# Patient Record
Sex: MALE | Race: WHITE | URBAN - METROPOLITAN AREA
[De-identification: names, ages, dates, MRNs, and addresses within clinical notes are randomized per-mention and may not be internally consistent; named-entity substitution may affect disease eponyms.]

---

## 2023-12-26 ENCOUNTER — HOSPITAL ENCOUNTER (OUTPATIENT)
Facility: HOSPITAL | Age: 43
Setting detail: OBSERVATION
Discharge: HOME | End: 2023-12-27
Attending: EMERGENCY MEDICINE | Admitting: HOSPITALIST
Payer: COMMERCIAL

## 2023-12-26 ENCOUNTER — APPOINTMENT (EMERGENCY)
Dept: CT IMAGING | Facility: HOSPITAL | Age: 43
End: 2023-12-26
Payer: COMMERCIAL

## 2023-12-26 ENCOUNTER — APPOINTMENT (OUTPATIENT)
Dept: RADIOLOGY | Facility: HOSPITAL | Age: 43
End: 2023-12-26
Payer: COMMERCIAL

## 2023-12-26 ENCOUNTER — APPOINTMENT (EMERGENCY)
Dept: RADIOLOGY | Facility: HOSPITAL | Age: 43
End: 2023-12-26
Payer: COMMERCIAL

## 2023-12-26 DIAGNOSIS — T50.901A ACCIDENTAL DRUG OVERDOSE, INITIAL ENCOUNTER: Primary | ICD-10-CM

## 2023-12-26 DIAGNOSIS — R79.89 ELEVATED TROPONIN: ICD-10-CM

## 2023-12-26 DIAGNOSIS — G93.40 ENCEPHALOPATHY: ICD-10-CM

## 2023-12-26 DIAGNOSIS — H81.10 BPPV (BENIGN PAROXYSMAL POSITIONAL VERTIGO): ICD-10-CM

## 2023-12-26 PROBLEM — K21.00 GASTRO-ESOPHAGEAL REFLUX DISEASE WITH ESOPHAGITIS: Status: ACTIVE | Noted: 2022-02-08

## 2023-12-26 PROBLEM — G93.41 ACUTE METABOLIC ENCEPHALOPATHY: Status: ACTIVE | Noted: 2023-12-26

## 2023-12-26 PROBLEM — M25.511 ACUTE PAIN OF RIGHT SHOULDER: Status: ACTIVE | Noted: 2023-12-26

## 2023-12-26 PROBLEM — M48.061 SPINAL STENOSIS OF LUMBAR REGION: Status: ACTIVE | Noted: 2021-07-18

## 2023-12-26 PROBLEM — R09.02 HYPOXIA: Status: ACTIVE | Noted: 2023-12-26

## 2023-12-26 PROBLEM — S20.319A ABRASION OF CHEST WALL: Status: ACTIVE | Noted: 2023-12-26

## 2023-12-26 LAB
2HR DELTA HS TROPONIN: 59 NG/L
4HR DELTA HS TROPONIN: 91 NG/L
AMORPH URATE CRY URNS QL MICRO: NORMAL /HPF
AMPHETAMINES SERPL QL SCN: POSITIVE
ANION GAP SERPL CALCULATED.3IONS-SCNC: 12 MMOL/L
ATRIAL RATE: 89 BPM
BACTERIA UR QL AUTO: NORMAL /HPF
BARBITURATES UR QL: NEGATIVE
BASOPHILS # BLD AUTO: 0.09 THOUSANDS/ÂΜL (ref 0–0.1)
BASOPHILS NFR BLD AUTO: 1 % (ref 0–1)
BENZODIAZ UR QL: NEGATIVE
BILIRUB UR QL STRIP: NEGATIVE
BUN SERPL-MCNC: 20 MG/DL (ref 5–25)
CALCIUM SERPL-MCNC: 9.1 MG/DL (ref 8.4–10.2)
CARDIAC TROPONIN I PNL SERPL HS: 112 NG/L
CARDIAC TROPONIN I PNL SERPL HS: 21 NG/L
CARDIAC TROPONIN I PNL SERPL HS: 80 NG/L
CHLORIDE SERPL-SCNC: 104 MMOL/L (ref 96–108)
CLARITY UR: CLEAR
CO2 SERPL-SCNC: 23 MMOL/L (ref 21–32)
COCAINE UR QL: POSITIVE
COLOR UR: YELLOW
CREAT SERPL-MCNC: 0.96 MG/DL (ref 0.6–1.3)
EOSINOPHIL # BLD AUTO: 0.22 THOUSAND/ÂΜL (ref 0–0.61)
EOSINOPHIL NFR BLD AUTO: 3 % (ref 0–6)
ERYTHROCYTE [DISTWIDTH] IN BLOOD BY AUTOMATED COUNT: 14 % (ref 11.6–15.1)
GAS + CO PNL BLDA: 4 % (ref 0–1.5)
GFR SERPL CREATININE-BSD FRML MDRD: 95 ML/MIN/1.73SQ M
GLUCOSE SERPL-MCNC: 203 MG/DL (ref 65–140)
GLUCOSE UR STRIP-MCNC: NEGATIVE MG/DL
HCT VFR BLD AUTO: 45.1 % (ref 36.5–49.3)
HGB BLD-MCNC: 15.1 G/DL (ref 12–17)
HGB UR QL STRIP.AUTO: NEGATIVE
IMM GRANULOCYTES # BLD AUTO: 0.03 THOUSAND/UL (ref 0–0.2)
IMM GRANULOCYTES NFR BLD AUTO: 0 % (ref 0–2)
KETONES UR STRIP-MCNC: NEGATIVE MG/DL
LEUKOCYTE ESTERASE UR QL STRIP: NEGATIVE
LYMPHOCYTES # BLD AUTO: 1.13 THOUSANDS/ÂΜL (ref 0.6–4.47)
LYMPHOCYTES NFR BLD AUTO: 14 % (ref 14–44)
MCH RBC QN AUTO: 30.7 PG (ref 26.8–34.3)
MCHC RBC AUTO-ENTMCNC: 33.5 G/DL (ref 31.4–37.4)
MCV RBC AUTO: 92 FL (ref 82–98)
MONOCYTES # BLD AUTO: 0.58 THOUSAND/ÂΜL (ref 0.17–1.22)
MONOCYTES NFR BLD AUTO: 7 % (ref 4–12)
NEUTROPHILS # BLD AUTO: 5.92 THOUSANDS/ÂΜL (ref 1.85–7.62)
NEUTS SEG NFR BLD AUTO: 75 % (ref 43–75)
NITRITE UR QL STRIP: NEGATIVE
NON-SQ EPI CELLS URNS QL MICRO: NORMAL /HPF
NRBC BLD AUTO-RTO: 0 /100 WBCS
OPIATES UR QL SCN: NEGATIVE
OXYCODONE+OXYMORPHONE UR QL SCN: POSITIVE
P AXIS: 79 DEGREES
PCP UR QL: NEGATIVE
PH UR STRIP.AUTO: 6.5 [PH]
PLATELET # BLD AUTO: 142 THOUSANDS/UL (ref 149–390)
PMV BLD AUTO: 11.3 FL (ref 8.9–12.7)
POTASSIUM SERPL-SCNC: 3.8 MMOL/L (ref 3.5–5.3)
PR INTERVAL: 128 MS
PROT UR STRIP-MCNC: ABNORMAL MG/DL
QRS AXIS: 23 DEGREES
QRSD INTERVAL: 98 MS
QT INTERVAL: 400 MS
QTC INTERVAL: 486 MS
RBC # BLD AUTO: 4.92 MILLION/UL (ref 3.88–5.62)
RBC #/AREA URNS AUTO: NORMAL /HPF
SODIUM SERPL-SCNC: 139 MMOL/L (ref 135–147)
SP GR UR STRIP.AUTO: >=1.03
T WAVE AXIS: 56 DEGREES
THC UR QL: POSITIVE
UROBILINOGEN UR QL STRIP.AUTO: 1 E.U./DL
VENTRICULAR RATE: 89 BPM
WBC # BLD AUTO: 7.97 THOUSAND/UL (ref 4.31–10.16)
WBC #/AREA URNS AUTO: NORMAL /HPF

## 2023-12-26 PROCEDURE — 93005 ELECTROCARDIOGRAM TRACING: CPT

## 2023-12-26 PROCEDURE — 82375 ASSAY CARBOXYHB QUANT: CPT | Performed by: EMERGENCY MEDICINE

## 2023-12-26 PROCEDURE — 99223 1ST HOSP IP/OBS HIGH 75: CPT | Performed by: PHYSICIAN ASSISTANT

## 2023-12-26 PROCEDURE — 73030 X-RAY EXAM OF SHOULDER: CPT

## 2023-12-26 PROCEDURE — 96376 TX/PRO/DX INJ SAME DRUG ADON: CPT

## 2023-12-26 PROCEDURE — 80048 BASIC METABOLIC PNL TOTAL CA: CPT | Performed by: EMERGENCY MEDICINE

## 2023-12-26 PROCEDURE — 99284 EMERGENCY DEPT VISIT MOD MDM: CPT

## 2023-12-26 PROCEDURE — 96361 HYDRATE IV INFUSION ADD-ON: CPT

## 2023-12-26 PROCEDURE — 81001 URINALYSIS AUTO W/SCOPE: CPT | Performed by: EMERGENCY MEDICINE

## 2023-12-26 PROCEDURE — 81003 URINALYSIS AUTO W/O SCOPE: CPT | Performed by: EMERGENCY MEDICINE

## 2023-12-26 PROCEDURE — 36415 COLL VENOUS BLD VENIPUNCTURE: CPT | Performed by: EMERGENCY MEDICINE

## 2023-12-26 PROCEDURE — 80307 DRUG TEST PRSMV CHEM ANLYZR: CPT | Performed by: EMERGENCY MEDICINE

## 2023-12-26 PROCEDURE — 84484 ASSAY OF TROPONIN QUANT: CPT | Performed by: EMERGENCY MEDICINE

## 2023-12-26 PROCEDURE — 99285 EMERGENCY DEPT VISIT HI MDM: CPT | Performed by: EMERGENCY MEDICINE

## 2023-12-26 PROCEDURE — G1004 CDSM NDSC: HCPCS

## 2023-12-26 PROCEDURE — 70450 CT HEAD/BRAIN W/O DYE: CPT

## 2023-12-26 PROCEDURE — 85025 COMPLETE CBC W/AUTO DIFF WBC: CPT | Performed by: EMERGENCY MEDICINE

## 2023-12-26 PROCEDURE — 96374 THER/PROPH/DIAG INJ IV PUSH: CPT

## 2023-12-26 PROCEDURE — 71045 X-RAY EXAM CHEST 1 VIEW: CPT

## 2023-12-26 RX ORDER — PREGABALIN 150 MG/1
150 CAPSULE ORAL 3 TIMES DAILY
COMMUNITY
Start: 2023-12-26

## 2023-12-26 RX ORDER — PANTOPRAZOLE SODIUM 20 MG/1
20 TABLET, DELAYED RELEASE ORAL
Status: DISCONTINUED | OUTPATIENT
Start: 2023-12-27 | End: 2023-12-27 | Stop reason: HOSPADM

## 2023-12-26 RX ORDER — SODIUM CHLORIDE 9 MG/ML
3 INJECTION INTRAVENOUS
Status: DISCONTINUED | OUTPATIENT
Start: 2023-12-26 | End: 2023-12-27 | Stop reason: HOSPADM

## 2023-12-26 RX ORDER — OMEPRAZOLE 20 MG/1
20 CAPSULE, DELAYED RELEASE ORAL DAILY
COMMUNITY

## 2023-12-26 RX ORDER — ACETAMINOPHEN 325 MG/1
650 TABLET ORAL EVERY 4 HOURS PRN
Status: DISCONTINUED | OUTPATIENT
Start: 2023-12-26 | End: 2023-12-27 | Stop reason: HOSPADM

## 2023-12-26 RX ORDER — MONTELUKAST SODIUM 10 MG/1
10 TABLET ORAL
COMMUNITY

## 2023-12-26 RX ORDER — ONDANSETRON 2 MG/ML
4 INJECTION INTRAMUSCULAR; INTRAVENOUS ONCE
Status: COMPLETED | OUTPATIENT
Start: 2023-12-26 | End: 2023-12-26

## 2023-12-26 RX ORDER — SODIUM CHLORIDE 9 MG/ML
125 INJECTION, SOLUTION INTRAVENOUS CONTINUOUS
Status: DISPENSED | OUTPATIENT
Start: 2023-12-26 | End: 2023-12-27

## 2023-12-26 RX ORDER — ENOXAPARIN SODIUM 100 MG/ML
40 INJECTION SUBCUTANEOUS DAILY
Status: DISCONTINUED | OUTPATIENT
Start: 2023-12-27 | End: 2023-12-27 | Stop reason: HOSPADM

## 2023-12-26 RX ORDER — GINSENG 100 MG
1 CAPSULE ORAL 2 TIMES DAILY
Status: DISCONTINUED | OUTPATIENT
Start: 2023-12-26 | End: 2023-12-27 | Stop reason: HOSPADM

## 2023-12-26 RX ORDER — TRAZODONE HYDROCHLORIDE 50 MG/1
45 TABLET ORAL
COMMUNITY

## 2023-12-26 RX ORDER — ASPIRIN 325 MG
325 TABLET ORAL ONCE
Status: COMPLETED | OUTPATIENT
Start: 2023-12-26 | End: 2023-12-26

## 2023-12-26 RX ORDER — ONDANSETRON 2 MG/ML
4 INJECTION INTRAMUSCULAR; INTRAVENOUS EVERY 6 HOURS PRN
Status: DISCONTINUED | OUTPATIENT
Start: 2023-12-26 | End: 2023-12-27 | Stop reason: HOSPADM

## 2023-12-26 RX ADMIN — ONDANSETRON 4 MG: 2 INJECTION INTRAMUSCULAR; INTRAVENOUS at 17:36

## 2023-12-26 RX ADMIN — SODIUM CHLORIDE 125 ML/HR: 0.9 INJECTION, SOLUTION INTRAVENOUS at 21:15

## 2023-12-26 RX ADMIN — ASPIRIN 325 MG: 325 TABLET ORAL at 21:15

## 2023-12-26 RX ADMIN — ONDANSETRON 4 MG: 2 INJECTION INTRAMUSCULAR; INTRAVENOUS at 18:37

## 2023-12-26 RX ADMIN — ONDANSETRON 4 MG: 2 INJECTION INTRAMUSCULAR; INTRAVENOUS at 21:18

## 2023-12-26 RX ADMIN — SODIUM CHLORIDE 1000 ML: 0.9 INJECTION, SOLUTION INTRAVENOUS at 17:15

## 2023-12-26 RX ADMIN — BACITRACIN ZINC 1 SMALL APPLICATION: 500 OINTMENT TOPICAL at 21:54

## 2023-12-27 ENCOUNTER — APPOINTMENT (OUTPATIENT)
Dept: MRI IMAGING | Facility: HOSPITAL | Age: 43
End: 2023-12-27
Payer: COMMERCIAL

## 2023-12-27 VITALS
HEART RATE: 78 BPM | TEMPERATURE: 97.7 F | RESPIRATION RATE: 17 BRPM | SYSTOLIC BLOOD PRESSURE: 155 MMHG | OXYGEN SATURATION: 98 % | DIASTOLIC BLOOD PRESSURE: 89 MMHG

## 2023-12-27 LAB — GLUCOSE SERPL-MCNC: 134 MG/DL (ref 65–140)

## 2023-12-27 PROCEDURE — 82948 REAGENT STRIP/BLOOD GLUCOSE: CPT

## 2023-12-27 PROCEDURE — 99239 HOSP IP/OBS DSCHRG MGMT >30: CPT | Performed by: HOSPITALIST

## 2023-12-27 PROCEDURE — 70551 MRI BRAIN STEM W/O DYE: CPT

## 2023-12-27 RX ORDER — MECLIZINE HYDROCHLORIDE 25 MG/1
25 TABLET ORAL EVERY 8 HOURS PRN
Qty: 30 TABLET | Refills: 0 | Status: SHIPPED | OUTPATIENT
Start: 2023-12-27

## 2023-12-27 RX ORDER — MECLIZINE HCL 12.5 MG/1
25 TABLET ORAL EVERY 8 HOURS PRN
Status: DISCONTINUED | OUTPATIENT
Start: 2023-12-27 | End: 2023-12-27 | Stop reason: HOSPADM

## 2023-12-27 RX ADMIN — MECLIZINE HYDROCHLORIDE 25 MG: 12.5 TABLET ORAL at 14:01

## 2023-12-27 RX ADMIN — BACITRACIN ZINC 1 SMALL APPLICATION: 500 OINTMENT TOPICAL at 08:51

## 2023-12-27 RX ADMIN — ACETAMINOPHEN 650 MG: 325 TABLET ORAL at 06:36

## 2023-12-27 RX ADMIN — PANTOPRAZOLE SODIUM 20 MG: 20 TABLET, DELAYED RELEASE ORAL at 06:33

## 2023-12-27 RX ADMIN — ONDANSETRON 4 MG: 2 INJECTION INTRAMUSCULAR; INTRAVENOUS at 08:51

## 2023-12-27 RX ADMIN — PREGABALIN 150 MG: 100 CAPSULE ORAL at 08:50

## 2023-12-27 RX ADMIN — ENOXAPARIN SODIUM 40 MG: 40 INJECTION SUBCUTANEOUS at 08:52

## 2023-12-27 NOTE — DISCHARGE SUMMARY
[facility]  Discharge- Ethan Farfan 1980, 43 y.o. male MRN: [id number]  Unit/Bed#: ED 20 Encounter: [id number]  Primary Care Provider: No primary care provider on file.   Date and time admitted to hospital: 12/26/2023  4:57 PM    * Accidental overdose  Assessment & Plan  Initial urine drug screen was positive for amphetamine/methamphetamine,  cocaine, THC, and oxycodone  Patient required a total of 8 mg of Narcan  Patient denies any suicidal and/or homicidal ideation  No further inpatient testing, treatment, and/or workup is needed  Okay for DC home  Patient counseled on recreational drug cessation  Patient additionally reported some intermittent dizziness-will prescribe meclizine  Outpatient follow-up with PCP    Acute metabolic encephalopathy  Assessment & Plan  Resolved  Secondary to the multi recreational drug overdose as outlined above  CT brain-no acute intracranial abnormality  MRI brain--No acute intracranial abnormality. -A few scattered T2/FLAIR hyperintensities within the periventricular and deep white matter. Differential includes minimal microangiopathic changes, sequela of migraines, infectious/inflammatory process among other etiologies.   Patient reports having a history of chronic migraines  DC home      Hypoxia  Assessment & Plan  Resolved  Patient is on room air  Secondary to the overdose as outlined above  Once again, status post treatment with Narcan prior to admission  Chest x-ray-no acute pulmonary pathology  Patient is medically stable for discharge    Elevated troponin  Assessment & Plan  Patient denies chest pain  Patient was given extensive sternal rubs prior to coming into the hospital  High-sensitivity troponin testing x 3 - 21, 80, 112  Non-myocardial injury/ischemia related elevated troponins  No further workup      Acute pain of right shoulder  Assessment & Plan  X-ray right shoulder-no acute pathology  Okay for discharge with over-the-counter pain  control    Abrasion of chest wall  Assessment & Plan  S/p sternal rub when he was altered  Bacitracin topical  Okay for discharge        Medical Problems       Resolved Problems  Date Reviewed: 12/26/2023   None       Discharging Physician / Practitioner: Mitchell Noriega MD  PCP: No primary care provider on file.  Admission Date:   Admission Orders (From admission, onward)       Ordered        12/26/23 2040  Place in Observation  Once                          Discharge Date: 12/27/23    Consultations During Hospital Stay:  None    Procedures Performed:   None    Significant Findings / Test Results:   MRI brain--No acute intracranial abnormality. -A few scattered T2/FLAIR hyperintensities within the periventricular and deep white matter. Differential includes minimal microangiopathic changes, sequela of migraines, infectious/inflammatory process among other etiologies.   X-ray right shoulder-no acute osseous abnormality  CT head-no acute intracranial abnormality  Chest x-ray-no acute cardiopulmonary disease      Incidental Findings:   None    Test Results Pending at Discharge (will require follow up):   None     Outpatient Tests Requested:  None    Complications: None    Reason for Admission: Accidental K[place] recreational drug use overdose    Hospital Course:   Ethan Farfan is a 43 y.o. male patient who originally presented to the hospital on 12/26/2023 due to an altered mental status.  Please refer to the initial history and physical examination completed by Ricarda rodriguez PA-C for the initial presenting features and complaints.  In brief, the patient is a 43-year-old male, who was brought into the hospital by local law enforcement, after he was found in a rental home with a lot of drugs.  He had an unresponsive episode in the police cruiser and was brought to the hospital.  He was treated with 8 mg of Narcan.  Urine drug screen was positive for THC, cocaine, amphetamine, methamphetamine, and oxycodone.   Patient was monitored overnight.  He was found to have minimally elevated cardiac enzymes, however this was attributed to the fact that he received extensive sternal rubs prior to coming into the hospital.  Imaging performed is resulted as outlined above.  Images included a CAT scan of the head, chest x-ray, MRI brain, and x-ray of the right shoulder.  All testing was within normal limits.  By the afternoon of 12/27/2023, patient was back to normal from a mentation standpoint.  He complains of occasional episodes of dizziness, and 1 or 2 episodes of nausea.  He was noted to be tolerating a diet well.  He was discharged on 12/27/2023.  Patient will follow-up in the outpatient setting with his PCP.  Please refer to the assessment/plan portion of this discharge summary as outlined above for additional details regarding his stay.        Please see above list of diagnoses and related plan for additional information.     Condition at Discharge: fair    Discharge Day Visit / Exam:   Subjective: Patient seen, denies taking recreational drugs yesterday, complains of some nausea but for the most part is doing okay.  Reported 1 episode of dizziness also.  Vitals: Blood Pressure: 148/97 (12/27/23 0850)  Pulse: 72 (12/27/23 0850)  Temperature: 97.7 °F (36.5 °C) (12/27/23 0850)  Temp Source: Oral (12/27/23 0850)  Respirations: 17 (12/27/23 0850)  SpO2: 92 % (12/27/23 0850)  Exam:   Physical Exam  Vitals and nursing note reviewed.   Constitutional:       General: He is not in acute distress.     Appearance: Normal appearance. He is not ill-appearing.   HENT:      Head: Normocephalic and atraumatic.      Nose: Nose normal.   Eyes:      Extraocular Movements: Extraocular movements intact.      Pupils: Pupils are equal, round, and reactive to light.   Cardiovascular:      Rate and Rhythm: Normal rate and regular rhythm.      Pulses: Normal pulses.      Heart sounds: Normal heart sounds. No murmur heard.     No friction rub. No  gallop.   Pulmonary:      Effort: Pulmonary effort is normal.      Breath sounds: Normal breath sounds.   Abdominal:      General: There is no distension.      Palpations: Abdomen is soft. There is no mass.      Tenderness: There is no abdominal tenderness. There is no guarding or rebound.   Musculoskeletal:         General: No swelling or tenderness. Normal range of motion.      Cervical back: Normal range of motion and neck supple. No rigidity. No muscular tenderness.      Right lower leg: No edema.      Left lower leg: No edema.   Skin:     General: Skin is warm.      Capillary Refill: Capillary refill takes less than 2 seconds.      Findings: No erythema or rash.   Neurological:      General: No focal deficit present.      Mental Status: He is alert and oriented to person, place, and time. Mental status is at baseline.   Psychiatric:         Mood and Affect: Mood normal.         Behavior: Behavior normal.          Discussion with Family: Patient declined call to .     Discharge instructions/Information to patient and family:   See after visit summary for information provided to patient and family.      Provisions for Follow-Up Care:  See after visit summary for information related to follow-up care and any pertinent home health orders.      Mobility at time of Discharge:      HLM Goal achieved. Continue to encourage appropriate mobility.     Disposition:   Home    Planned Readmission: None     Discharge Statement:  I spent 40 minutes discharging the patient. This time was spent on the day of discharge. I had direct contact with the patient on the day of discharge. Greater than 50% of the total time was spent examining patient, answering all patient questions, arranging and discussing plan of care with patient as well as directly providing post-discharge instructions.  Additional time then spent on discharge activities.    Discharge Medications:  See after visit summary for reconciled discharge  medications provided to patient and/or family.      **Please Note: This note may have been constructed using a voice recognition system**

## 2023-12-27 NOTE — H&P
[facility]  H&P  Name: Ethan Farfan 43 y.o. male I MRN: [id number]  Unit/Bed#: ED 20 I Date of Admission: 12/26/2023   Date of Service: 12/26/2023 I Hospital Day: 0    Assessment/Plan   * Accidental overdose  Assessment & Plan  Patient found in someone's home, noted to have drugs in home and his car. While in police car became altered and unresponsive. Was sternal rubbed and given Narcan.  UDS polypharmacy drug abuse w/ Amph/Meth, Cocaine, THS, Oxycodone  Supportive care and monitoring    Hypoxia  Assessment & Plan  EMS noted pulse ox to 54%, currently stable on RA  monitor    Acute metabolic encephalopathy  Assessment & Plan  Patient with altered mental status at times  CT head negative  ED concerned for anoxic injury w/ significant hypoxia noted by EMS. Will obtain MRI brain in AM.    Elevated troponin  Assessment & Plan  Likely secondary to hypoxia  Continue to trend      Abrasion of chest wall  Assessment & Plan  S/p sternal rub when he was altered  Bacitracin topical    Acute pain of right shoulder  Assessment & Plan  Patient reports pain in his right shoulder, uclear of injury  Xray shoulder  Prn tylenol    VTE Pharmacologic Prophylaxis: VTE Score: 4 Moderate Risk (Score 3-4) - Pharmacological DVT Prophylaxis Ordered: enoxaparin (Lovenox).  Code Status: Level 1 - Full Code   Discussion with patient    Anticipated Length of Stay: Patient will be admitted on an observation basis with an anticipated length of stay of less than 2 midnights secondary to respiratory monitoring, testing.    Total Time Spent on Date of Encounter in care of patient: 75 mins. This time was spent on one or more of the following: performing physical exam; counseling and coordination of care; obtaining or reviewing history; documenting in the medical record; reviewing/ordering tests, medications or procedures; communicating with other healthcare professionals and discussing with patient's  family/caregivers.    Chief Complaint: overdose    History of Present Illness:  Ethan aFrfan is a 43 y.o. male with PMHx of GERD, chronic lumbar pain who presents with accidental overdose. Patient was found by police in a rental home that was not his with lots of drugs. In the back of the police car patient became unresponsive, police attempted sternal rub, EMS found patient hypoxic in the 50s. He was given Narcan with improvement.    Patient not able to recall much of the events, he reports only using cocaine and marijuana.     Review of Systems:  Review of Systems   Respiratory:  Negative for cough and shortness of breath.    Cardiovascular:  Positive for chest pain.   Musculoskeletal:  Positive for arthralgias.   Skin:  Positive for wound.   Psychiatric/Behavioral:  Positive for confusion.        Past Medical and Surgical History:   Past Medical History:   Diagnosis Date    Anxiety     Depression     Spinal stenosis of lumbar region     L 5, S1       Past Surgical History:   Procedure Laterality Date    BACK SURGERY      COLON SURGERY      ORBITAL RIM RECONSTRUCTION      SPINE SURGERY      Cervical disc surgery       Meds/Allergies:  Prior to Admission medications    Not on File     I have reviewed home medications with patient personally.    Allergies:   Allergies   Allergen Reactions    Pollen Extract Allergic Rhinitis    Amoxicillin-Pot Clavulanate Diarrhea     Explosive diarrhea       Social History:  Marital Status: Unknown   Patient Pre-hospital Living Situation: Alone  Patient Pre-hospital Level of Mobility: walks  Patient Pre-hospital Diet Restrictions: none  Substance Use History:   Social History     Substance and Sexual Activity   Alcohol Use Yes    Comment: social     Social History     Tobacco Use   Smoking Status Every Day    Current packs/day: 1.00    Average packs/day: 1 pack/day for 21.0 years (21.0 ttl pk-yrs)    Types: Cigarettes    Start date: 2003   Smokeless Tobacco Never     Social History      Substance and Sexual Activity   Drug Use Yes    Types: Cocaine, Marijuana       Family History:  Family History   Problem Relation Age of Onset    Diabetes Mother     Depression Mother     Cancer Father         colon       Physical Exam:     Vitals:   Blood Pressure: 164/79 (12/26/23 2015)  Pulse: 71 (12/26/23 2015)  Temperature: 98 °F (36.7 °C) (12/26/23 1700)  Temp Source: Tympanic (12/26/23 1700)  Respirations: 18 (12/26/23 2015)  SpO2: 93 % (12/26/23 2015)    Physical Exam  Vitals reviewed.   Constitutional:       General: He is not in acute distress.     Appearance: Normal appearance.   HENT:      Head: Normocephalic and atraumatic.      Right Ear: External ear normal.      Left Ear: External ear normal.      Nose: Nose normal.   Eyes:      General:         Right eye: No discharge.         Left eye: No discharge.      Conjunctiva/sclera: Conjunctivae normal.   Cardiovascular:      Rate and Rhythm: Normal rate and regular rhythm.      Heart sounds: Normal heart sounds. No murmur heard.  Pulmonary:      Effort: Pulmonary effort is normal. No respiratory distress.      Breath sounds: Wheezing present. No rales.   Chest:       Abdominal:      General: Bowel sounds are normal. There is no distension.      Palpations: Abdomen is soft.      Tenderness: There is no abdominal tenderness. There is no guarding.   Musculoskeletal:         General: Normal range of motion.      Cervical back: Normal range of motion.   Skin:     General: Skin is warm and dry.   Neurological:      Mental Status: Mental status is at baseline. He is lethargic.      GCS: GCS eye subscore is 4. GCS verbal subscore is 5. GCS motor subscore is 6.   Psychiatric:         Mood and Affect: Mood normal.         Behavior: Behavior normal.            Additional Data:     Lab Results:  Results from last 7 days   Lab Units 12/26/23  1709   WBC Thousand/uL 7.97   HEMOGLOBIN g/dL 15.1   HEMATOCRIT % 45.1   PLATELETS Thousands/uL 142*   NEUTROS PCT % 75    LYMPHS PCT % 14   MONOS PCT % 7   EOS PCT % 3     Results from last 7 days   Lab Units 12/26/23  1709   SODIUM mmol/L 139   POTASSIUM mmol/L 3.8   CHLORIDE mmol/L 104   CO2 mmol/L 23   BUN mg/dL 20   CREATININE mg/dL 0.96   ANION GAP mmol/L 12   CALCIUM mg/dL 9.1   GLUCOSE RANDOM mg/dL 203*       Lines/Drains:  Invasive Devices       Peripheral Intravenous Line  Duration             Peripheral IV 12/26/23 Distal;Left;Upper;Ventral (anterior) Arm <1 day                  Imaging: Reviewed radiology reports from this admission including: CT head  CT head without contrast   Final Result by Michael Patel MD (12/26 1859)      No acute intracranial abnormality.                  Workstation performed: [id number]         X-ray chest 1 view portable    (Results Pending)   MRI inpatient order    (Results Pending)   XR shoulder 2+ vw right    (Results Pending)       EKG and Other Studies Reviewed on Admission:   EKG: NSR. HR 89, reviewed in MUSE personally.    ** Please Note: This note has been constructed using a voice recognition system. **

## 2023-12-27 NOTE — ED NOTES
Patient given hygiene products to perform daily cares. Teeth brushed at this time and patient washed up at the sink.     Allison A Schoener, RN  12/27/23 5904

## 2023-12-27 NOTE — ASSESSMENT & PLAN NOTE
Initial urine drug screen was positive for amphetamine/methamphetamine,  cocaine, THC, and oxycodone  Patient required a total of 8 mg of Narcan  Patient denies any suicidal and/or homicidal ideation  No further inpatient testing, treatment, and/or workup is needed  Okay for DC home  Patient counseled on recreational drug cessation  Patient additionally reported some intermittent dizziness-will prescribe meclizine  Outpatient follow-up with PCP

## 2023-12-27 NOTE — ASSESSMENT & PLAN NOTE
Patient with altered mental status at times  CT head negative  ED concerned for anoxic injury w/ significant hypoxia noted by EMS. Will obtain MRI brain in AM.

## 2023-12-27 NOTE — ASSESSMENT & PLAN NOTE
Patient denies chest pain  Patient was given extensive sternal rubs prior to coming into the hospital  High-sensitivity troponin testing x 3 - 21, 80, 112  Non-myocardial injury/ischemia related elevated troponins  No further workup

## 2023-12-27 NOTE — DISCHARGE INSTR - AVS FIRST PAGE
Dear Ethan Farfan,     It was our pleasure to care for you here at [facility].  It is our hope that we were always able to exceed the expected standards for your care during your stay.  You were hospitalized due to a multi drug overdose.  You were cared for on the medical/surgical floor by the [facility] who covers for your primary care physician (PCP), No primary care provider on file., while you were hospitalized.  If you have any questions or concerns related to this hospitalization, you may contact us at .  For follow up as well as any medication refills, we recommend that you follow up with your primary care physician.  A registered nurse will reach out to you by phone within a few days after your discharge to answer any additional questions that you may have after going home.  However, at this time we provide for you here, the most important instructions / recommendations at discharge:     Notable Medication Adjustments -   New prescription meclizine 25 mg take 1 tablet every 8 hours as needed for dizziness  Testing Required after Discharge -   To be further determined in the outpatient setting by your PCP  Important follow up information -   Please follow-up with your primary care provider within 7 to 10 days  Other Instructions -   Please refrain from any additional recreational drug use  Please review this entire after visit summary as additional general instructions including medication list, appointments, activity, diet, any pertinent wound care, and other additional recommendations from your care team that may be provided for you.      Sincerely,     Mitchell Noriega MD

## 2023-12-27 NOTE — ASSESSMENT & PLAN NOTE
Resolved  Secondary to the multi recreational drug overdose as outlined above  CT brain-no acute intracranial abnormality  MRI brain--No acute intracranial abnormality. -A few scattered T2/FLAIR hyperintensities within the periventricular and deep white matter. Differential includes minimal microangiopathic changes, sequela of migraines, infectious/inflammatory process among other etiologies.   Patient reports having a history of chronic migraines  DC home

## 2023-12-27 NOTE — UTILIZATION REVIEW
Initial Clinical Review    Admission: Date/Time/Statement:   Admission Orders (From admission, onward)       Ordered        12/26/23 2040  Place in Observation  Once                          Orders Placed This Encounter   Procedures    Place in Observation     Standing Status:   Standing     Number of Occurrences:   1     Order Specific Question:   Level of Care     Answer:   Med Surg [16]     ED Arrival Information       Expected   12/26/2023 16:44    Arrival   12/26/2023 16:57    Acuity   Emergent              Means of arrival   Ambulance    Escorted by   [facility]    Service   Hospitalist    Admission type   Emergency              Arrival complaint   OD             Chief Complaint   Patient presents with    Overdose - Accidental       Initial Presentation: 43 y.o. male with PMHx of GERD, chronic lumbar pain who presents with accidental overdose. Patient was found by police in a rental home that was not his with lots of drugs. In the back of the police car patient became unresponsive, police attempted sternal rub, EMS found patient hypoxic in the 50s. He was given Narcan with improvement.     Patient not able to recall much of the events, he reports only using cocaine and marijuana.     ADMIT OBSERVATION STATUS    Date:     Day 2:      ED Triage Vitals   Temperature Pulse Respirations Blood Pressure SpO2   12/26/23 1700 12/26/23 1700 12/26/23 1700 12/26/23 1700 12/26/23 1659   98 °F (36.7 °C) 86 20 (!) 165/116 (!) 54 %      Temp Source Heart Rate Source Patient Position - Orthostatic VS BP Location FiO2 (%)   12/26/23 1700 12/26/23 1700 12/26/23 1700 12/26/23 1700 --   Tympanic Monitor Sitting Right arm       Pain Score       12/26/23 1700       No Pain          Wt Readings from Last 1 Encounters:   No data found for Wt     Additional Vital Signs:         Pertinent Labs/Diagnostic Test Results:   XR shoulder 2+ vw right   Final Result by Brendon Oneil MD (12/27 0802)      No acute osseous abnormality.  "     Workstation performed: [id number]         CT head without contrast   Final Result by Michael Patel MD (12/26 1859)      No acute intracranial abnormality.                  Workstation performed: [id number]         X-ray chest 1 view portable   Final Result by Brendon Oneil MD (12/27 0803)      No acute cardiopulmonary disease.                  Workstation performed: [id number]         MRI inpatient order    (Results Pending)         Results from last 7 days   Lab Units 12/26/23  1709   WBC Thousand/uL 7.97   HEMOGLOBIN g/dL 15.1   HEMATOCRIT % 45.1   PLATELETS Thousands/uL 142*   NEUTROS ABS Thousands/µL 5.92         Results from last 7 days   Lab Units 12/26/23  1709   SODIUM mmol/L 139   POTASSIUM mmol/L 3.8   CHLORIDE mmol/L 104   CO2 mmol/L 23   ANION GAP mmol/L 12   BUN mg/dL 20   CREATININE mg/dL 0.96   EGFR ml/min/1.73sq m 95   CALCIUM mg/dL 9.1             Results from last 7 days   Lab Units 12/26/23  1709   GLUCOSE RANDOM mg/dL 203*             No results found for: \"BETA-HYDROXYBUTYRATE\"                   Results from last 7 days   Lab Units 12/26/23  2115 12/26/23  1926 12/26/23  1709   HS TNI 0HR ng/L  --   --  21   HS TNI 2HR ng/L  --  80*  --    HSTNI D2 ng/L  --  59*  --    HS TNI 4HR ng/L 112*  --   --    HSTNI D4 ng/L 91*  --   --                                                                      Results from last 7 days   Lab Units 12/26/23  1858   CLARITY UA  Clear   COLOR UA  Yellow   SPEC GRAV UA  >=1.030*   PH UA  6.5   GLUCOSE UA mg/dl Negative   KETONES UA mg/dl Negative   BLOOD UA  Negative   PROTEIN UA mg/dl 2+*   NITRITE UA  Negative   BILIRUBIN UA  Negative   UROBILINOGEN UA E.U./dl 1.0   LEUKOCYTES UA  Negative   WBC UA /hpf 1-2   RBC UA /hpf 2-4   BACTERIA UA /hpf Occasional   EPITHELIAL CELLS WET PREP /hpf Occasional             Results from last 7 days   Lab Units 12/26/23  1858   AMPH/METH  Positive*   BARBITURATE UR  Negative   BENZODIAZEPINE UR  Negative "   COCAINE UR  Positive*   OPIATE UR  Negative   PCP UR  Negative   THC UR  Positive*                                       ED Treatment:   Medication Administration from 12/26/2023 1657 to 12/27/2023 0812         Date/Time Order Dose Route Action Action by Comments     12/26/2023 1927 EST sodium chloride 0.9 % bolus 1,000 mL 0 mL Intravenous Stopped Shira Amaya, RN --     12/26/2023 1715 EST sodium chloride 0.9 % bolus 1,000 mL 1,000 mL Intravenous New Bag Victorina Fairchild, RN --     12/26/2023 1736 EST ondansetron (ZOFRAN) injection 4 mg 4 mg Intravenous Given Victorina Fairchild, RN --     12/26/2023 1837 EST ondansetron (ZOFRAN) injection 4 mg 4 mg Intravenous Given Victorina Fairchild, RN --     12/26/2023 2115 EST aspirin tablet 325 mg 325 mg Oral Given Shira Amaya, RN --     12/27/2023 0636 EST acetaminophen (TYLENOL) tablet 650 mg 650 mg Oral Given Shira Amaya, RN --     12/26/2023 2118 EST ondansetron (ZOFRAN) injection 4 mg 4 mg Intravenous Given Shira Amaya, RN --     12/26/2023 2115 EST sodium chloride 0.9 % infusion 125 mL/hr Intravenous New Bag Shira Amaya, RN --     12/27/2023 0633 EST pantoprazole (PROTONIX) EC tablet 20 mg 20 mg Oral Given Shira Amaya RN --     12/26/2023 2154 EST bacitracin topical ointment 1 small application 1 small application Topical Given Shira Amaya RN --          Past Medical History:   Diagnosis Date    Anxiety     Depression     Spinal stenosis of lumbar region     L 5, S1     Present on Admission:   Accidental overdose   Hypoxia   Acute metabolic encephalopathy   Elevated troponin   Acute pain of right shoulder   Abrasion of chest wall      Admitting Diagnosis: Overdose of drug [T50.901A]  Age/Sex: 43 y.o. male  Admission Orders:  Scheduled Medications:  bacitracin, 1 small application, Topical, BID  enoxaparin, 40 mg, Subcutaneous, Daily  pantoprazole, 20 mg, Oral, Early Morning  pregabalin, 150 mg, Oral, TID      Continuous IV  Infusions:  sodium chloride, 125 mL/hr, Intravenous, Continuous      PRN Meds:  acetaminophen, 650 mg, Oral, Q4H PRN  ondansetron, 4 mg, Intravenous, Q6H PRN  sodium chloride (PF), 3 mL, Intravenous, Q1H PRN        IP CONSULT TO ED CRISIS WORKER  IP CONSULT TO CASE MANAGEMENT    Network Utilization Review Department  ATTENTION: Please call with any questions or concerns to [phone] and carefully listen to the prompts so that you are directed to the right person. All voicemails are confidential.   For Discharge needs, contact Care Management DC Support Team at [phone] opt. [phone]  Send all requests for admission clinical reviews, approved or denied determinations and any other requests to dedicated fax number below belonging to the campus where the patient is receiving treatment. List of dedicated fax numbers for the Facilities:  FACILITY NAME UR FAX NUMBER   ADMISSION DENIALS (Administrative/Medical Necessity) [phone]   DISCHARGE SUPPORT TEAM (NETWORK) [phone]   PARENT CHILD HEALTH (Maternity/NICU/Pediatrics) [phone]   [facility] [phone]   [facility] [phone]   Count includes the [facility] [phone]   [facility] [phone]   [facility] [phone]   [facility] [phone]   [facility] [phone]   [facility] [phone]   [facility] [phone]   [facility] [phone]   [facility] [phone]

## 2023-12-27 NOTE — ED PROVIDER NOTES
History  Chief Complaint   Patient presents with    Overdose - Accidental     Patient is a 43-year-old male with history of drug abuse that presents for evaluation of suspected overdose.  History is primarily obtained from from the police because the patient is not amenable exactly happened.  Police state that they are investigating a call and found that the patient was staying at someone's vacation home in the garage.  There his car was parked and was apparently surrounded by drug paraphernalia.  He was put in the back of a police cruiser and then subsequently became unresponsive.  The please officer aggressively sternal rub to him leaving an abrasion to his sternum.  Patient was found to be hypoxic in the 50s upon EMS arrival breathing about 5 times a minute.  Given a total of 8 mg intranasal Narcan with improvement of symptoms.  Patient is currently confused somewhat about what happened but is awake and following commands.  He does complain of some left-sided chest pain on the way in, it seems to be improving.        None       History reviewed. No pertinent past medical history.    Past Surgical History:   Procedure Laterality Date    SPINE SURGERY         History reviewed. No pertinent family history.  I have reviewed and agree with the history as documented.    E-Cigarette/Vaping     E-Cigarette/Vaping Substances     Social History     Tobacco Use    Smoking status: Never    Smokeless tobacco: Never   Substance Use Topics    Drug use: Yes       Review of Systems   Constitutional:  Negative for fever.   HENT:  Negative for sore throat.    Eyes:  Negative for photophobia.   Respiratory:  Negative for shortness of breath.    Cardiovascular:  Positive for chest pain.   Gastrointestinal:  Negative for abdominal pain.   Genitourinary:  Negative for dysuria.   Musculoskeletal:  Negative for back pain.   Skin:  Negative for rash.   Neurological:  Negative for light-headedness.        Confusion   Hematological:   Negative for adenopathy.   Psychiatric/Behavioral:  Negative for agitation.    All other systems reviewed and are negative.      Physical Exam  Physical Exam  Vitals reviewed.   Constitutional:       General: He is not in acute distress.     Appearance: He is well-developed.   HENT:      Head: Normocephalic.   Eyes:      Pupils: Pupils are equal, round, and reactive to light.   Cardiovascular:      Rate and Rhythm: Normal rate and regular rhythm.      Heart sounds: Normal heart sounds. No murmur heard.     No friction rub. No gallop.   Pulmonary:      Effort: Pulmonary effort is normal.      Breath sounds: Normal breath sounds.   Abdominal:      General: Bowel sounds are normal. There is no distension.      Palpations: Abdomen is soft.      Tenderness: There is no abdominal tenderness. There is no guarding.   Musculoskeletal:         General: Normal range of motion.      Cervical back: Normal range of motion and neck supple.      Comments: Abrasion over the sternum   Skin:     Capillary Refill: Capillary refill takes less than 2 seconds.   Neurological:      Mental Status: He is alert and oriented to person, place, and time.      Cranial Nerves: No cranial nerve deficit.      Sensory: No sensory deficit.      Motor: No abnormal muscle tone.      Comments: Alert and oriented x 3 but somewhat confused.  Cranial nerves strength sensation intact throughout   Psychiatric:         Behavior: Behavior normal.         Thought Content: Thought content normal.         Judgment: Judgment normal.         Vital Signs  ED Triage Vitals   Temperature Pulse Respirations Blood Pressure SpO2   12/26/23 1700 12/26/23 1700 12/26/23 1700 12/26/23 1700 12/26/23 1659   98 °F (36.7 °C) 86 20 (!) 165/116 (!) 54 %      Temp Source Heart Rate Source Patient Position - Orthostatic VS BP Location FiO2 (%)   12/26/23 1700 12/26/23 1700 12/26/23 1700 12/26/23 1700 --   Tympanic Monitor Sitting Right arm       Pain Score       12/26/23 1700        No Pain           Vitals:    12/26/23 1700 12/26/23 1745 12/26/23 2015   BP: (!) 165/116 (!) 166/101 164/79   Pulse: 86 94 71   Patient Position - Orthostatic VS: Sitting           Visual Acuity      ED Medications  Medications   sodium chloride (PF) 0.9 % injection 3 mL (has no administration in time range)   aspirin tablet 325 mg (has no administration in time range)   sodium chloride 0.9 % bolus 1,000 mL (0 mL Intravenous Stopped 12/26/23 1927)   ondansetron (ZOFRAN) injection 4 mg (4 mg Intravenous Given 12/26/23 1736)   ondansetron (ZOFRAN) injection 4 mg (4 mg Intravenous Given 12/26/23 1837)       Diagnostic Studies  Results Reviewed       Procedure Component Value Units Date/Time    HS Troponin I 2hr [[id number]]  (Abnormal) Collected: 12/26/23 1926    Lab Status: Final result Specimen: Blood from Arm, Left Updated: 12/26/23 2000     hs TnI 2hr 80 ng/L      Delta 2hr hsTnI 59 ng/L     Rapid drug screen, urine [[id number]]  (Abnormal) Collected: 12/26/23 1858    Lab Status: Final result Specimen: Urine, Clean Catch Updated: 12/26/23 1949     Amph/Meth UR Positive     Barbiturate Ur Negative     Benzodiazepine Urine Negative     Cocaine Urine Positive     Methadone Urine --     Opiate Urine Negative     PCP Ur Negative     THC Urine Positive     Oxycodone Urine Positive    Narrative:      Presumptive report. If requested, specimen will be sent to reference lab for confirmation.  FOR MEDICAL PURPOSES ONLY.   IF CONFIRMATION NEEDED PLEASE CONTACT THE LAB WITHIN 5 DAYS.    Drug Screen Cutoff Levels:  AMPHETAMINE/METHAMPHETAMINES  1000 ng/mL  BARBITURATES     200 ng/mL  BENZODIAZEPINES     200 ng/mL  COCAINE      300 ng/mL  METHADONE      300 ng/mL  OPIATES      300 ng/mL  PHENCYCLIDINE     25 ng/mL  THC       50 ng/mL  OXYCODONE      100 ng/mL    Urine Microscopic [[id number]] Collected: 12/26/23 1858    Lab Status: Final result Specimen: Urine, Clean Catch Updated: 12/26/23 1919     RBC, UA 2-4 /hpf      WBC, UA  1-2 /hpf      Epithelial Cells Occasional /hpf      Bacteria, UA Occasional /hpf      AMORPH URATES Occasional /hpf     HS Troponin I 4hr [[id number]]     Lab Status: No result Specimen: Blood     UA w Reflex to Microscopic w Reflex to Culture [[id number]]  (Abnormal) Collected: 12/26/23 1858    Lab Status: Final result Specimen: Urine, Clean Catch Updated: 12/26/23 1907     Color, UA Yellow     Clarity, UA Clear     Specific Gravity, UA >=1.030     pH, UA 6.5     Leukocytes, UA Negative     Nitrite, UA Negative     Protein, UA 2+ mg/dl      Glucose, UA Negative mg/dl      Ketones, UA Negative mg/dl      Urobilinogen, UA 1.0 E.U./dl      Bilirubin, UA Negative     Occult Blood, UA Negative    Carboxyhemoglobin [[id number]]  (Abnormal) Collected: 12/26/23 1736    Lab Status: Final result Specimen: Blood from Arm, Left Updated: 12/26/23 1743     Carbon Monoxide, Blood 4.0 %     Narrative:      Therapeutic levels (1 mg/mL and 2 mg/mL) of hydroxocobalamin may interfere with the fCOHb and fMetHb where it may cause lower than expected values  Normal Carboxyhemoglobin range for nonsmokers is <1.5%   Normal Carboxyhemoglobin range for smokers is 1.5% to 5.1%     HS Troponin 0hr (reflex protocol) [[id number]]  (Normal) Collected: 12/26/23 1709    Lab Status: Final result Specimen: Blood from Arm, Left Updated: 12/26/23 1740     hs TnI 0hr 21 ng/L     Basic metabolic panel [[id number]]  (Abnormal) Collected: 12/26/23 1709    Lab Status: Final result Specimen: Blood from Arm, Left Updated: 12/26/23 1735     Sodium 139 mmol/L      Potassium 3.8 mmol/L      Chloride 104 mmol/L      CO2 23 mmol/L      ANION GAP 12 mmol/L      BUN 20 mg/dL      Creatinine 0.96 mg/dL      Glucose 203 mg/dL      Calcium 9.1 mg/dL      eGFR 95 ml/min/1.73sq m     Narrative:      National Kid[employer] Disease Foundation guidelines for Chronic Kidney Disease (CKD):     Stage 1 with normal or high GFR (GFR > 90 mL/min/1.73 square meters)    Stage 2 Mild CKD (GFR =  60-89 mL/min/1.73 square meters)    Stage 3A Moderate CKD (GFR = 45-59 mL/min/1.73 square meters)    Stage 3B Moderate CKD (GFR = 30-44 mL/min/1.73 square meters)    Stage 4 Severe CKD (GFR = 15-29 mL/min/1.73 square meters)    Stage 5 End Stage CKD (GFR <15 mL/min/1.73 square meters)  Note: GFR calculation is accurate only with a steady state creatinine    CBC and differential [[id number]]  (Abnormal) Collected: 12/26/23 1709    Lab Status: Final result Specimen: Blood from Arm, Left Updated: 12/26/23 1716     WBC 7.97 Thousand/uL      RBC 4.92 Million/uL      Hemoglobin 15.1 g/dL      Hematocrit 45.1 %      MCV 92 fL      MCH 30.7 pg      MCHC 33.5 g/dL      RDW 14.0 %      MPV 11.3 fL      Platelets 142 Thousands/uL      nRBC 0 /100 WBCs      Neutrophils Relative 75 %      Immat GRANS % 0 %      Lymphocytes Relative 14 %      Monocytes Relative 7 %      Eosinophils Relative 3 %      Basophils Relative 1 %      Neutrophils Absolute 5.92 Thousands/µL      Immature Grans Absolute 0.03 Thousand/uL      Lymphocytes Absolute 1.13 Thousands/µL      Monocytes Absolute 0.58 Thousand/µL      Eosinophils Absolute 0.22 Thousand/µL      Basophils Absolute 0.09 Thousands/µL                    CT head without contrast   Final Result by Michael Patel MD (12/26 1859)      No acute intracranial abnormality.                  Workstation performed: [id number]         X-ray chest 1 view portable    (Results Pending)              Procedures  ECG 12 Lead Documentation Only    Date/Time: 12/26/2023 9:19 PM    Performed by: Dong Ramsey MD  Authorized by: Dong Ramsey MD    ECG reviewed by me, the ED Provider: yes    Patient location:  ED  Previous ECG:     Previous ECG:  Unavailable    Comparison to cardiac monitor: Yes    Interpretation:     Interpretation: non-specific    Rate:     ECG rate assessment: normal    Rhythm:     Rhythm: sinus rhythm    Ectopy:     Ectopy: none    QRS:     QRS axis:  Normal    QRS  intervals:  Normal  Conduction:     Conduction: normal    ST segments:     ST segments:  Normal  T waves:     T waves: normal    Other findings:     Other findings: prolonged qTc interval             ED Course                                             Medical Decision Making  Patient is a 43-year-old male who presents for evaluation after suspected overdose.  Patient still with some continued confusion, some concern for some mild anoxic brain injury after prolonged hypoxia related to opioid overdose.  Also troponin elevated likely secondary to hypoxia.  Blood work reviewed, second opponent elevated.  EKG however is nonischemic.  Patient chest pain-free at this time.  Given aspirin and plan to admit to the hospital.  CT imaging negative, reviewed by myself.     Amount and/or Complexity of Data Reviewed  Labs: ordered.  Radiology: ordered.    Risk  OTC drugs.  Prescription drug management.  Decision regarding hospitalization.             Disposition  Final diagnoses:   Accidental drug overdose, initial encounter   Elevated troponin   Encephalopathy     Time reflects when diagnosis was documented in both MDM as applicable and the Disposition within this note       Time User Action Codes Description Comment    12/26/2023  8:39 PM Dong Ramsey Add [T50.901A] Accidental drug overdose, initial encounter     12/26/2023  8:39 PM Dong Ramsey Add [R79.89] Elevated troponin     12/26/2023  8:39 PM Dong Ramsey Add [G93.40] Encephalopathy           ED Disposition       ED Disposition   Admit    Condition   Stable    Date/Time   Tue Dec 26, 2023  8:39 PM    Comment   Case was discussed with HENRRY and the patient's admission status was agreed to be Admission Status: observation status to the service of Dr. Noriega .               Follow-up Information    None         Patient's Medications    No medications on file       No discharge procedures on file.    PDMP Review       None            ED Provider  Electronically  Signed by             Dong Ramsey MD  12/26/23 2022

## 2023-12-28 NOTE — ED CARE HANDOFF
[facility] Warm Handoff Outcome Note    Patient name Ethan Farfan  Location ED 20/ED 20 MRN [id number]  Age: 43 y.o.          Plan Type:  Warm Handoff                                                                                    Plan Date: 12/28/2023  Service:  ED Warm Handoff      Substance Use History:  Opiates    Warm Handoff Update:  Patient Admitted to Hospital    Warm Handoff Outcome: Treatment Related Resources
